# Patient Record
Sex: MALE | Race: WHITE | URBAN - NONMETROPOLITAN AREA
[De-identification: names, ages, dates, MRNs, and addresses within clinical notes are randomized per-mention and may not be internally consistent; named-entity substitution may affect disease eponyms.]

---

## 2022-04-18 ENCOUNTER — WEB ENCOUNTER (OUTPATIENT)
Dept: URBAN - NONMETROPOLITAN AREA CLINIC 2 | Facility: CLINIC | Age: 47
End: 2022-04-18

## 2022-04-18 ENCOUNTER — LAB OUTSIDE AN ENCOUNTER (OUTPATIENT)
Dept: URBAN - NONMETROPOLITAN AREA CLINIC 2 | Facility: CLINIC | Age: 47
End: 2022-04-18

## 2022-04-18 ENCOUNTER — OFFICE VISIT (OUTPATIENT)
Dept: URBAN - NONMETROPOLITAN AREA CLINIC 2 | Facility: CLINIC | Age: 47
End: 2022-04-18
Payer: COMMERCIAL

## 2022-04-18 VITALS
WEIGHT: 250 LBS | SYSTOLIC BLOOD PRESSURE: 146 MMHG | TEMPERATURE: 97.9 F | DIASTOLIC BLOOD PRESSURE: 91 MMHG | HEART RATE: 61 BPM | HEIGHT: 70 IN | BODY MASS INDEX: 35.79 KG/M2

## 2022-04-18 DIAGNOSIS — R10.84 GENERALIZED ABDOMINAL PAIN: ICD-10-CM

## 2022-04-18 DIAGNOSIS — K21.9 GASTROESOPHAGEAL REFLUX DISEASE, UNSPECIFIED WHETHER ESOPHAGITIS PRESENT: ICD-10-CM

## 2022-04-18 DIAGNOSIS — K62.5 RECTAL BLEEDING: ICD-10-CM

## 2022-04-18 DIAGNOSIS — R19.7 DIARRHEA, UNSPECIFIED TYPE: ICD-10-CM

## 2022-04-18 PROCEDURE — 99244 OFF/OP CNSLTJ NEW/EST MOD 40: CPT | Performed by: NURSE PRACTITIONER

## 2022-04-18 PROCEDURE — 99204 OFFICE O/P NEW MOD 45 MIN: CPT | Performed by: NURSE PRACTITIONER

## 2022-04-18 RX ORDER — BENAZEPRIL HYDROCHLORIDE 20 MG/1
1 TABLET TABLET, FILM COATED ORAL ONCE A DAY
Status: ACTIVE | COMMUNITY

## 2022-04-18 RX ORDER — HYOSCYAMINE SULFATE 0.12 MG/1
1 TABLET UNDER THE TONGUE AND ALLOW TO DISSOLVE  AS NEEDED TABLET SUBLINGUAL
Qty: 60 TABLET | Refills: 6 | OUTPATIENT
Start: 2022-04-18 | End: 2022-11-13

## 2022-04-18 RX ORDER — OMEPRAZOLE 40 MG/1
1 CAPSULE 30 MINUTES BEFORE MORNING MEAL CAPSULE, DELAYED RELEASE ORAL ONCE A DAY
Status: ACTIVE | COMMUNITY

## 2022-04-18 RX ORDER — AMLODIPINE BESYLATE 10 MG/1
1 TABLET TABLET ORAL ONCE A DAY
Status: ACTIVE | COMMUNITY

## 2022-04-18 NOTE — HPI-TODAY'S VISIT:
4/18/2022 Mr. Baca is a 46-year-old male referred to me by Dr. Mcdaniel for consultation of abdominal pain diarrhea and rectal bleeding.  He states he has had irregular bowels for years.  He will go several days with multiple episodes of diarrhea and then skip a day.  Recently he is developed increased abdominal pain with rectal bleeding.  He states has been to the emergency room twice in [place] over the past 2 years with similar symptoms.  He states his abdomen swells up with severe pain.  He has had imaging per Dr. Ballard, we do not have a copy of these today.  He has never had a colonoscopy.  He does not take anything regularly for his bowels however Dr. Hernandez did give him what sounds like dicyclomine with some improvement in his symptoms.  He does take omeprazole 40 mg daily for reflux with good control.  He has never had an EGD or colonoscopy in the past.  Otherwise he is a healthy person.  He is concerned about his gallbladder.  He has had imaging of this in the past that was normal per his report.  MB

## 2022-04-20 ENCOUNTER — LAB OUTSIDE AN ENCOUNTER (OUTPATIENT)
Dept: URBAN - METROPOLITAN AREA CLINIC 92 | Facility: CLINIC | Age: 47
End: 2022-04-20

## 2022-04-20 ENCOUNTER — TELEPHONE ENCOUNTER (OUTPATIENT)
Dept: URBAN - METROPOLITAN AREA CLINIC 92 | Facility: CLINIC | Age: 47
End: 2022-04-20

## 2022-04-20 LAB
A/G RATIO: 1.8
ALBUMIN: 4.4
ALKALINE PHOSPHATASE: 72
ALT (SGPT): 47
AST (SGOT): 23
BASO (ABSOLUTE): 0.1
BASOS: 1
BILIRUBIN, TOTAL: 0.3
BUN/CREATININE RATIO: 17
BUN: 20
C-REACTIVE PROTEIN, QUANT: 3
CALCIUM: 9.2
CARBON DIOXIDE, TOTAL: 22
CHLORIDE: 103
CREATININE: 1.16
DEAMIDATED GLIADIN ABS, IGA: 6
DEAMIDATED GLIADIN ABS, IGG: 4
EGFR: 79
ENDOMYSIAL ANTIBODY IGA: NEGATIVE
EOS (ABSOLUTE): 0.1
EOS: 1
GLOBULIN, TOTAL: 2.4
GLUCOSE: 81
H PYLORI, IGM ABS: <9
H. PYLORI, IGA ABS: <9
HEMATOCRIT: 50.7
HEMATOLOGY COMMENTS:: (no result)
HEMOGLOBIN: 16.5
IMMATURE CELLS: (no result)
IMMATURE GRANS (ABS): 0
IMMATURE GRANULOCYTES: 0
IMMUNOGLOBULIN A, QN, SERUM: 219
LYMPHS (ABSOLUTE): 2.6
LYMPHS: 35
MCH: 28.3
MCHC: 32.5
MCV: 87
MONOCYTES(ABSOLUTE): 0.5
MONOCYTES: 6
NEUTROPHILS (ABSOLUTE): 4.2
NEUTROPHILS: 57
NRBC: (no result)
PLATELETS: 287
POTASSIUM: 4.3
PROTEIN, TOTAL: 6.8
RBC: 5.84
RDW: 13.1
SEDIMENTATION RATE-WESTERGREN: 2
SODIUM: 143
T-TRANSGLUTAMINASE (TTG) IGA: <2
T-TRANSGLUTAMINASE (TTG) IGG: 6
T4,FREE(DIRECT): 0.93
TSH: 2.63
WBC: 7.5

## 2022-04-25 ENCOUNTER — OFFICE VISIT (OUTPATIENT)
Dept: URBAN - NONMETROPOLITAN AREA SURGERY CENTER 1 | Facility: SURGERY CENTER | Age: 47
End: 2022-04-25
Payer: COMMERCIAL

## 2022-04-25 DIAGNOSIS — R19.7 ACUTE DIARRHEA: ICD-10-CM

## 2022-04-25 DIAGNOSIS — K29.60 ADENOPAPILLOMATOSIS GASTRICA: ICD-10-CM

## 2022-04-25 DIAGNOSIS — K22.89 ESOPHAGEAL BLEED, NON-VARICEAL: ICD-10-CM

## 2022-04-25 DIAGNOSIS — R10.84 ABDOMINAL CRAMPING, GENERALIZED: ICD-10-CM

## 2022-04-25 DIAGNOSIS — K63.89 BACTERIAL OVERGROWTH SYNDROME: ICD-10-CM

## 2022-04-25 DIAGNOSIS — D12.2 ADENOMA OF ASCENDING COLON: ICD-10-CM

## 2022-04-25 PROCEDURE — 43239 EGD BIOPSY SINGLE/MULTIPLE: CPT | Performed by: INTERNAL MEDICINE

## 2022-04-25 PROCEDURE — 45385 COLONOSCOPY W/LESION REMOVAL: CPT | Performed by: INTERNAL MEDICINE

## 2022-04-25 PROCEDURE — G8907 PT DOC NO EVENTS ON DISCHARG: HCPCS | Performed by: INTERNAL MEDICINE

## 2022-04-25 PROCEDURE — 45380 COLONOSCOPY AND BIOPSY: CPT | Performed by: INTERNAL MEDICINE

## 2022-05-03 ENCOUNTER — TELEPHONE ENCOUNTER (OUTPATIENT)
Dept: URBAN - METROPOLITAN AREA CLINIC 92 | Facility: CLINIC | Age: 47
End: 2022-05-03

## 2022-05-05 ENCOUNTER — OFFICE VISIT (OUTPATIENT)
Dept: URBAN - NONMETROPOLITAN AREA CLINIC 2 | Facility: CLINIC | Age: 47
End: 2022-05-05

## 2022-06-23 ENCOUNTER — OFFICE VISIT (OUTPATIENT)
Dept: URBAN - NONMETROPOLITAN AREA CLINIC 2 | Facility: CLINIC | Age: 47
End: 2022-06-23

## 2022-08-18 ENCOUNTER — DASHBOARD ENCOUNTERS (OUTPATIENT)
Age: 47
End: 2022-08-18

## 2022-08-18 ENCOUNTER — OFFICE VISIT (OUTPATIENT)
Dept: URBAN - NONMETROPOLITAN AREA CLINIC 2 | Facility: CLINIC | Age: 47
End: 2022-08-18
Payer: COMMERCIAL

## 2022-08-18 ENCOUNTER — LAB OUTSIDE AN ENCOUNTER (OUTPATIENT)
Dept: URBAN - NONMETROPOLITAN AREA CLINIC 2 | Facility: CLINIC | Age: 47
End: 2022-08-18

## 2022-08-18 VITALS
WEIGHT: 251.2 LBS | DIASTOLIC BLOOD PRESSURE: 86 MMHG | HEIGHT: 70 IN | BODY MASS INDEX: 35.96 KG/M2 | SYSTOLIC BLOOD PRESSURE: 132 MMHG | TEMPERATURE: 97.4 F | HEART RATE: 63 BPM

## 2022-08-18 DIAGNOSIS — R10.84 GENERALIZED ABDOMINAL PAIN: ICD-10-CM

## 2022-08-18 DIAGNOSIS — K76.0 FATTY LIVER: ICD-10-CM

## 2022-08-18 DIAGNOSIS — K82.8 SLUDGE IN GALLBLADDER: ICD-10-CM

## 2022-08-18 DIAGNOSIS — K21.9 GASTROESOPHAGEAL REFLUX DISEASE, UNSPECIFIED WHETHER ESOPHAGITIS PRESENT: ICD-10-CM

## 2022-08-18 PROBLEM — 12063002: Status: ACTIVE | Noted: 2022-04-18

## 2022-08-18 PROBLEM — 166166006: Status: ACTIVE | Noted: 2022-04-20

## 2022-08-18 PROBLEM — 62315008: Status: ACTIVE | Noted: 2022-04-18

## 2022-08-18 PROBLEM — 428283002: Status: ACTIVE | Noted: 2022-08-18

## 2022-08-18 PROCEDURE — 99214 OFFICE O/P EST MOD 30 MIN: CPT | Performed by: INTERNAL MEDICINE

## 2022-08-18 RX ORDER — BENAZEPRIL HYDROCHLORIDE 20 MG/1
1 TABLET TABLET, FILM COATED ORAL ONCE A DAY
Status: ACTIVE | COMMUNITY

## 2022-08-18 RX ORDER — OMEPRAZOLE 40 MG/1
1 CAPSULE 30 MINUTES BEFORE MORNING MEAL CAPSULE, DELAYED RELEASE ORAL ONCE A DAY
Status: ACTIVE | COMMUNITY

## 2022-08-18 RX ORDER — AMLODIPINE BESYLATE 10 MG/1
1 TABLET TABLET ORAL ONCE A DAY
Status: ACTIVE | COMMUNITY

## 2022-08-18 RX ORDER — HYOSCYAMINE SULFATE 0.12 MG/1
1 TABLET UNDER THE TONGUE AND ALLOW TO DISSOLVE  AS NEEDED TABLET SUBLINGUAL
Qty: 60 TABLET | Refills: 6 | Status: ACTIVE | COMMUNITY
Start: 2022-04-18 | End: 2022-11-13

## 2022-08-18 NOTE — HPI-TODAY'S VISIT:
4/18/2022 Mr. Baca is a 46-year-old male referred to me by Dr. Mcdaniel for consultation of abdominal pain diarrhea and rectal bleeding.  He states he has had irregular bowels for years.  He will go several days with multiple episodes of diarrhea and then skip a day.  Recently he is developed increased abdominal pain with rectal bleeding.  He states has been to the emergency room twice in [place] over the past 2 years with similar symptoms.  He states his abdomen swells up with severe pain.  He has had imaging per Dr. Ballard, we do not have a copy of these today.  He has never had a colonoscopy.  He does not take anything regularly for his bowels however Dr. Hernandez did give him what sounds like dicyclomine with some improvement in his symptoms.  He does take omeprazole 40 mg daily for reflux with good control.  He has never had an EGD or colonoscopy in the past.  Otherwise he is a healthy person.  He is concerned about his gallbladder.  He has had imaging of this in the past that was normal per his report.  MB 8/18/2022 Joellen presents for follow-up of abdominal pain and diarrhea with rectal bleeding.  Since his last visit his ALT was mildly elevated and his TTG IgG was mildly up.  His ultrasound the gallbladder shows mild sludge no wall thickening or inflammation.  His EGD shows mild reflux and gastritis his small bowel biopsies are normal.  His colonoscopy reveals 2 polyps, no diverticular disease, mild nodularity at the TI.  His biopsies of the TI are normal, his random biopsies are normal, and he has tubular adenomas.  He states after the colonoscopy his bowels have normalized.  He has had no further abdominal pain.  He is still concerned about his gallbladder.  Although his pain is typically left-sided he has a strong family history of gallbladder disease with similar presentation.  Today he agrees to repeat a HIDA scan if he has another flare of abdominal pain and if that is normal proceed with Xifaxan for IBS D.  Today he is doing well otherwise with no new GI complaints.  MB

## 2022-08-19 LAB
A/G RATIO: 1.9
ALBUMIN: 4.5
ALKALINE PHOSPHATASE: 66
ALT (SGPT): 39
AST (SGOT): 33
BASO (ABSOLUTE): 0.1
BASOS: 1
BILIRUBIN, TOTAL: 0.4
BUN/CREATININE RATIO: 14
BUN: 17
CALCIUM: 9
CARBON DIOXIDE, TOTAL: 23
CHLORIDE: 104
CREATININE: 1.2
EGFR: 76
EOS (ABSOLUTE): 0.1
EOS: 1
GLOBULIN, TOTAL: 2.4
GLUCOSE: 94
HBSAG SCREEN: NEGATIVE
HCV AB: <0.1
HEMATOCRIT: 49
HEMATOLOGY COMMENTS:: (no result)
HEMOGLOBIN: 15.7
HEP A AB, IGM: NEGATIVE
HEP B CORE AB, IGM: NEGATIVE
IMMATURE CELLS: (no result)
IMMATURE GRANS (ABS): 0
IMMATURE GRANULOCYTES: 0
INTERPRETATION:: (no result)
LYMPHS (ABSOLUTE): 2.5
LYMPHS: 37
MCH: 27.9
MCHC: 32
MCV: 87
MONOCYTES(ABSOLUTE): 0.5
MONOCYTES: 7
NEUTROPHILS (ABSOLUTE): 3.7
NEUTROPHILS: 54
NRBC: (no result)
PLATELETS: 252
POTASSIUM: 4.4
PROTEIN, TOTAL: 6.9
RBC: 5.63
RDW: 13.2
SODIUM: 142
WBC: 6.8

## 2022-08-24 PROBLEM — 102614006: Status: ACTIVE | Noted: 2022-04-18

## 2022-08-24 PROBLEM — 27123005: Status: ACTIVE | Noted: 2022-08-18

## 2022-08-24 PROBLEM — 197321007: Status: ACTIVE | Noted: 2022-08-18

## 2022-08-24 PROBLEM — 235595009: Status: ACTIVE | Noted: 2022-04-18

## 2023-02-23 ENCOUNTER — OFFICE VISIT (OUTPATIENT)
Dept: URBAN - NONMETROPOLITAN AREA CLINIC 2 | Facility: CLINIC | Age: 48
End: 2023-02-23